# Patient Record
Sex: MALE | Race: WHITE | NOT HISPANIC OR LATINO | URBAN - METROPOLITAN AREA
[De-identification: names, ages, dates, MRNs, and addresses within clinical notes are randomized per-mention and may not be internally consistent; named-entity substitution may affect disease eponyms.]

---

## 2023-12-14 ENCOUNTER — TELEPHONE (OUTPATIENT)
Dept: PRIMARY CARE | Facility: CLINIC | Age: 63
End: 2023-12-14
Payer: COMMERCIAL

## 2023-12-14 DIAGNOSIS — Z00.00 WELLNESS EXAMINATION: ICD-10-CM

## 2023-12-14 NOTE — TELEPHONE ENCOUNTER
He's scheduled for a physical on 12-29-23 @ 9:00 but he would like to see you some time next week.  He's leaving for [place] on the 29th.  Could you please sign pended lab orders?  Can I add him on some time next week?

## 2023-12-14 NOTE — TELEPHONE ENCOUNTER
Labs ordered.  It looks like we could put him on on Friday 12/22.  Thank you and let me know if there is any issues.    Chidi Dupree MD

## 2023-12-15 ASSESSMENT — PROMIS GLOBAL HEALTH SCALE
RATE_QUALITY_OF_LIFE: VERY GOOD
EMOTIONAL_PROBLEMS: RARELY
CARRYOUT_SOCIAL_ACTIVITIES: EXCELLENT
CARRYOUT_PHYSICAL_ACTIVITIES: COMPLETELY
RATE_PHYSICAL_HEALTH: GOOD
RATE_SOCIAL_SATISFACTION: VERY GOOD
RATE_MENTAL_HEALTH: VERY GOOD
RATE_AVERAGE_FATIGUE: MILD
RATE_AVERAGE_PAIN: 5
RATE_GENERAL_HEALTH: GOOD

## 2023-12-18 ENCOUNTER — LAB (OUTPATIENT)
Dept: LAB | Facility: LAB | Age: 63
End: 2023-12-18
Payer: COMMERCIAL

## 2023-12-18 DIAGNOSIS — Z00.00 WELLNESS EXAMINATION: ICD-10-CM

## 2023-12-18 LAB
25(OH)D3 SERPL-MCNC: 41 NG/ML (ref 31–100)
ALBUMIN SERPL-MCNC: 4.4 G/DL (ref 3.5–5)
ALP BLD-CCNC: 68 U/L (ref 35–125)
ALT SERPL-CCNC: 18 U/L (ref 5–40)
ANION GAP SERPL CALC-SCNC: 11 MMOL/L
APPEARANCE UR: CLEAR
AST SERPL-CCNC: 23 U/L (ref 5–40)
BASOPHILS # BLD AUTO: 0.03 X10*3/UL (ref 0–0.1)
BASOPHILS NFR BLD AUTO: 0.6 %
BILIRUB SERPL-MCNC: 0.7 MG/DL (ref 0.1–1.2)
BILIRUB UR STRIP.AUTO-MCNC: NEGATIVE MG/DL
BUN SERPL-MCNC: 22 MG/DL (ref 8–25)
CALCIUM SERPL-MCNC: 9.3 MG/DL (ref 8.5–10.4)
CHLORIDE SERPL-SCNC: 104 MMOL/L (ref 97–107)
CHOLEST SERPL-MCNC: 225 MG/DL (ref 133–200)
CHOLEST/HDLC SERPL: 3.6 {RATIO}
CO2 SERPL-SCNC: 25 MMOL/L (ref 24–31)
COLOR UR: NORMAL
CREAT SERPL-MCNC: 1.3 MG/DL (ref 0.4–1.6)
CRP SERPL HS-MCNC: 0.2 MG/L
EOSINOPHIL # BLD AUTO: 0.05 X10*3/UL (ref 0–0.7)
EOSINOPHIL NFR BLD AUTO: 1 %
ERYTHROCYTE [DISTWIDTH] IN BLOOD BY AUTOMATED COUNT: 11.6 % (ref 11.5–14.5)
EST. AVERAGE GLUCOSE BLD GHB EST-MCNC: 108 MG/DL
GFR SERPL CREATININE-BSD FRML MDRD: 62 ML/MIN/1.73M*2
GLUCOSE SERPL-MCNC: 101 MG/DL (ref 65–99)
GLUCOSE UR STRIP.AUTO-MCNC: NORMAL MG/DL
HBA1C MFR BLD: 5.4 %
HCT VFR BLD AUTO: 46.4 % (ref 41–52)
HDLC SERPL-MCNC: 63 MG/DL
HGB BLD-MCNC: 16 G/DL (ref 13.5–17.5)
IMM GRANULOCYTES # BLD AUTO: 0.01 X10*3/UL (ref 0–0.7)
IMM GRANULOCYTES NFR BLD AUTO: 0.2 % (ref 0–0.9)
KETONES UR STRIP.AUTO-MCNC: NEGATIVE MG/DL
LDLC SERPL CALC-MCNC: 135 MG/DL (ref 65–130)
LEUKOCYTE ESTERASE UR QL STRIP.AUTO: NEGATIVE
LYMPHOCYTES # BLD AUTO: 2.08 X10*3/UL (ref 1.2–4.8)
LYMPHOCYTES NFR BLD AUTO: 41 %
MCH RBC QN AUTO: 31.6 PG (ref 26–34)
MCHC RBC AUTO-ENTMCNC: 34.5 G/DL (ref 32–36)
MCV RBC AUTO: 92 FL (ref 80–100)
MONOCYTES # BLD AUTO: 0.49 X10*3/UL (ref 0.1–1)
MONOCYTES NFR BLD AUTO: 9.7 %
NEUTROPHILS # BLD AUTO: 2.41 X10*3/UL (ref 1.2–7.7)
NEUTROPHILS NFR BLD AUTO: 47.5 %
NITRITE UR QL STRIP.AUTO: NEGATIVE
NRBC BLD-RTO: 0 /100 WBCS (ref 0–0)
PH UR STRIP.AUTO: 5 [PH]
PLATELET # BLD AUTO: 273 X10*3/UL (ref 150–450)
POTASSIUM SERPL-SCNC: 4.3 MMOL/L (ref 3.4–5.1)
PROT SERPL-MCNC: 6.3 G/DL (ref 5.9–7.9)
PROT UR STRIP.AUTO-MCNC: NEGATIVE MG/DL
PSA SERPL-MCNC: 0.9 NG/ML
RBC # BLD AUTO: 5.06 X10*6/UL (ref 4.5–5.9)
RBC # UR STRIP.AUTO: NEGATIVE /UL
SODIUM SERPL-SCNC: 140 MMOL/L (ref 133–145)
SP GR UR STRIP.AUTO: 1.02
TRIGL SERPL-MCNC: 135 MG/DL (ref 40–150)
TSH SERPL DL<=0.05 MIU/L-ACNC: 2.46 MIU/L (ref 0.27–4.2)
UROBILINOGEN UR STRIP.AUTO-MCNC: NORMAL MG/DL
WBC # BLD AUTO: 5.1 X10*3/UL (ref 4.4–11.3)

## 2023-12-18 PROCEDURE — 85025 COMPLETE CBC W/AUTO DIFF WBC: CPT

## 2023-12-18 PROCEDURE — 83036 HEMOGLOBIN GLYCOSYLATED A1C: CPT

## 2023-12-18 PROCEDURE — 81003 URINALYSIS AUTO W/O SCOPE: CPT

## 2023-12-18 PROCEDURE — 82306 VITAMIN D 25 HYDROXY: CPT

## 2023-12-18 PROCEDURE — 80053 COMPREHEN METABOLIC PANEL: CPT

## 2023-12-18 PROCEDURE — 36415 COLL VENOUS BLD VENIPUNCTURE: CPT

## 2023-12-18 PROCEDURE — 84443 ASSAY THYROID STIM HORMONE: CPT

## 2023-12-18 PROCEDURE — 86141 C-REACTIVE PROTEIN HS: CPT

## 2023-12-18 PROCEDURE — 84153 ASSAY OF PSA TOTAL: CPT

## 2023-12-18 PROCEDURE — 80061 LIPID PANEL: CPT

## 2023-12-22 ENCOUNTER — OFFICE VISIT (OUTPATIENT)
Dept: PRIMARY CARE | Facility: CLINIC | Age: 63
End: 2023-12-22
Payer: COMMERCIAL

## 2023-12-22 VITALS
BODY MASS INDEX: 25.92 KG/M2 | SYSTOLIC BLOOD PRESSURE: 110 MMHG | HEIGHT: 69 IN | HEART RATE: 79 BPM | OXYGEN SATURATION: 98 % | DIASTOLIC BLOOD PRESSURE: 76 MMHG | WEIGHT: 175 LBS

## 2023-12-22 DIAGNOSIS — D12.6 SESSILE SERRATED POLYP OF COLON: ICD-10-CM

## 2023-12-22 DIAGNOSIS — Z00.00 WELLNESS EXAMINATION: Primary | ICD-10-CM

## 2023-12-22 DIAGNOSIS — L57.0 ACTINIC KERATOSES: ICD-10-CM

## 2023-12-22 DIAGNOSIS — N50.89 SCROTUM SWELLING: ICD-10-CM

## 2023-12-22 DIAGNOSIS — R00.2 PALPITATIONS: ICD-10-CM

## 2023-12-22 PROBLEM — E78.2 MIXED HYPERLIPIDEMIA: Status: ACTIVE | Noted: 2020-12-21

## 2023-12-22 PROBLEM — M51.379 DEGENERATION OF LUMBAR OR LUMBOSACRAL INTERVERTEBRAL DISC: Status: ACTIVE | Noted: 2021-05-26

## 2023-12-22 PROBLEM — Z96.641 HISTORY OF TOTAL HIP REPLACEMENT, RIGHT: Status: ACTIVE | Noted: 2019-10-30

## 2023-12-22 PROBLEM — M16.11 PRIMARY OSTEOARTHRITIS OF RIGHT HIP: Status: ACTIVE | Noted: 2023-12-22

## 2023-12-22 PROBLEM — R93.1 AGATSTON CORONARY ARTERY CALCIUM SCORE LESS THAN 100: Status: ACTIVE | Noted: 2021-05-24

## 2023-12-22 PROBLEM — M16.12 PRIMARY OSTEOARTHRITIS OF LEFT HIP: Status: ACTIVE | Noted: 2021-05-24

## 2023-12-22 PROBLEM — R79.89 ELEVATED TESTOSTERONE LEVEL: Status: ACTIVE | Noted: 2021-03-01

## 2023-12-22 PROBLEM — M51.37 DEGENERATION OF LUMBAR OR LUMBOSACRAL INTERVERTEBRAL DISC: Status: ACTIVE | Noted: 2021-05-26

## 2023-12-22 PROBLEM — N18.2 CKD (CHRONIC KIDNEY DISEASE), STAGE II: Status: RESOLVED | Noted: 2023-12-22 | Resolved: 2023-12-22

## 2023-12-22 PROBLEM — M16.11 PRIMARY OSTEOARTHRITIS OF RIGHT HIP: Status: RESOLVED | Noted: 2023-12-22 | Resolved: 2023-12-22

## 2023-12-22 PROBLEM — N18.2 CKD (CHRONIC KIDNEY DISEASE), STAGE II: Status: ACTIVE | Noted: 2023-12-22

## 2023-12-22 PROBLEM — Z98.890 HX OF LUMBOSACRAL SPINE SURGERY: Status: ACTIVE | Noted: 2021-05-26

## 2023-12-22 PROCEDURE — 93000 ELECTROCARDIOGRAM COMPLETE: CPT | Performed by: INTERNAL MEDICINE

## 2023-12-22 PROCEDURE — UHSPHYS PR UH SELECT PHYSICAL: Performed by: INTERNAL MEDICINE

## 2023-12-22 PROCEDURE — 1036F TOBACCO NON-USER: CPT | Performed by: INTERNAL MEDICINE

## 2023-12-22 RX ORDER — ASCORBIC ACID 125 MG
TABLET,CHEWABLE ORAL
COMMUNITY

## 2023-12-22 RX ORDER — AMOXICILLIN 500 MG/1
CAPSULE ORAL
COMMUNITY
Start: 2023-11-29

## 2023-12-22 ASSESSMENT — ENCOUNTER SYMPTOMS
WOUND: 0
DIARRHEA: 0
HEADACHES: 0
SHORTNESS OF BREATH: 0
BACK PAIN: 0
CONSTIPATION: 0
COUGH: 0
DYSPHORIC MOOD: 0
DYSURIA: 0
SORE THROAT: 0
ARTHRALGIAS: 0
WEAKNESS: 0
FATIGUE: 0
CHEST TIGHTNESS: 0
PALPITATIONS: 1
DIZZINESS: 0

## 2023-12-22 NOTE — PATIENT INSTRUCTIONS
Wellness exam/physical  Continued healthy diet rich in fruits, vegetables, fiber, lean protein recommended  Continued regular exercise routine recommended  Vaccines discussed/recommended include:  RSV and COVID-19  Shingrix    Intermittent palpitations (recorded heart rate of 200 bpm lasting several minutes on 2 occasions)  Evaluate for cardiac arrhythmia/cardiomyopathy  Cardiac event monitor ordered  Echocardiogram ordered  Referral to electrophysiology for consultation  Consider purchasing [employer]    Right scrotal swelling, suspected hydrocele  Ultrasound of scrotum  Referral to urology for consultation    History of colon polyp (sessile serrated polyp)  Screening colonoscopy in 12/2025    Actinic keratoses  Continue routine follow-up with dermatology for full body skin exams  Limit sun exposure as able    Follow-up  Wellness exam/physical in 1 year, December 2024

## 2023-12-22 NOTE — PROGRESS NOTES
Subjective   Patient ID: Chidi Fowler is a 63 y.o. male who presents for Annual Exam.    Wellness exam/physical    Overall, patient is feeling well, exercising regularly, maintaining healthy diet.    He notes concern regarding cardiovascular health.  Previous CT calcium score was 0 in 2019.  He exercises to a high level without chest pain, dizziness, unanticipated dyspnea, or palpitations.  He notes concern regarding peak heart rate in the 160 range, stating it seems higher than it should be and does not improve despite his continued exercise.  Interestingly, he shares 2 episodes of documented rapid heart rate over the last 1 year.  The most recent episode occurred on 6/28/2023 (with review of his wearable device today).  Each episode has occurred when he was settling down to go to sleep.  He measured with [employer] chest monitor that recorded heart rate at 225 bpm and lasting aprox 4 minutes.  During episode, patient states that he was having racing thoughts regarding current events/dizziness/etc.  There was no lightheadedness, chest pain, shortness of breath, syncope.  He has not had these episodes at any other time or with exertion.  He denies any history of arrhythmias.  ECG today notable for sinus bradycardia without ischemic changes or QT prolongation.    Right scrotal swelling  Onset 1 month ago.  Patient denies any trauma.  No pain.  Mild irritation due to the size of the scrotum.  No dysuria, hematuria.  No history of similar problems.    History of colon polyp (sessile serrated polyp)  Previous colonoscopy on 12/4/2020 at which time single sessile serrated polyp removed.  Colonoscopy report suggest 7-year follow-up, the patient I discussed pursuing 5-year follow-up.    Actinic keratoses  Patient sees dermatology regularly    Health maintenance  Exercise: High-level exercise including weights/core exercises/stretching/and 20 minutes of aerobic activity such as cycling, 3 times per week  Ophthalmology:  "Followed by Dr. Macedo and Dr. Harrison (for retinal \"freckle\").  Dentistry: Up-to-date    Social  Born in [place],  with children  Occupation: [employer] company, co-owner with 2 other partners             Review of Systems   Constitutional:  Negative for fatigue.   HENT:  Negative for sore throat.    Respiratory:  Negative for cough, chest tightness and shortness of breath.    Cardiovascular:  Positive for palpitations. Negative for chest pain and leg swelling.   Gastrointestinal:  Negative for constipation and diarrhea.   Genitourinary:  Positive for scrotal swelling. Negative for dysuria and urgency.   Musculoskeletal:  Negative for arthralgias, back pain and gait problem.   Skin:  Negative for wound.   Neurological:  Negative for dizziness, syncope, weakness and headaches.   Psychiatric/Behavioral:  Negative for dysphoric mood.        Objective   /76 (BP Location: Left arm, Patient Position: Sitting, BP Cuff Size: Adult)   Pulse 79   Ht 1.753 m (5' 9\")   Wt 79.4 kg (175 lb)   SpO2 98%   BMI 25.84 kg/m²     Physical Exam  Vitals reviewed.   Constitutional:       Appearance: Normal appearance.   HENT:      Head: Normocephalic.      Right Ear: Tympanic membrane normal.      Left Ear: Tympanic membrane normal.      Mouth/Throat:      Mouth: Mucous membranes are moist.   Eyes:      Conjunctiva/sclera: Conjunctivae normal.      Pupils: Pupils are equal, round, and reactive to light.   Cardiovascular:      Rate and Rhythm: Normal rate and regular rhythm.   Pulmonary:      Effort: Pulmonary effort is normal.      Breath sounds: Normal breath sounds. No wheezing.   Genitourinary:     Testes: Normal.      Prostate: Normal.      Comments: Scrotum with uniformly soft, nontender swelling surrounding right testicle  Musculoskeletal:      Right lower leg: No edema.      Left lower leg: No edema.      Comments: Bilateral hips and knees with full range of motion   Lymphadenopathy:      Cervical: No " cervical adenopathy.   Neurological:      General: No focal deficit present.      Mental Status: He is alert and oriented to person, place, and time.         Assessment/Plan     Wellness exam/physical  Continued healthy diet rich in fruits, vegetables, fiber, lean protein recommended  Continued regular exercise routine recommended  Vaccines discussed/recommended include:  RSV and COVID-19 and Shingrix    Intermittent palpitations (recorded heart rate of 200 bpm lasting several minutes on 2 occasions)  Evaluate for cardiac arrhythmia/cardiomyopathy  Cardiac event monitor ordered  Echocardiogram ordered  Referral to electrophysiology for consultation  Consider purchasing [employer]    Right scrotal swelling, suspected hydrocele  Ultrasound of scrotum  Referral to urology for consultation    History of colon polyp (sessile serrated polyp)  Screening colonoscopy in 12/2025    Actinic keratoses  Continue routine follow-up with dermatology for full body skin exams  Limit sun exposure as able    Follow-up  Wellness exam/physical in 1 year, December 2024    Chidi Dupree MD

## 2023-12-26 ENCOUNTER — ANCILLARY PROCEDURE (OUTPATIENT)
Dept: RADIOLOGY | Facility: CLINIC | Age: 63
End: 2023-12-26
Payer: COMMERCIAL

## 2023-12-26 DIAGNOSIS — N50.89 SCROTUM SWELLING: ICD-10-CM

## 2023-12-26 PROCEDURE — 76870 US EXAM SCROTUM: CPT

## 2023-12-26 PROCEDURE — 93976 VASCULAR STUDY: CPT | Performed by: RADIOLOGY

## 2023-12-26 PROCEDURE — 76870 US EXAM SCROTUM: CPT | Performed by: RADIOLOGY

## 2023-12-29 ENCOUNTER — APPOINTMENT (OUTPATIENT)
Dept: PRIMARY CARE | Facility: CLINIC | Age: 63
End: 2023-12-29
Payer: COMMERCIAL

## 2023-12-29 PROBLEM — N44.2 CYST OF TESTIS: Status: ACTIVE | Noted: 2023-12-29

## 2023-12-29 PROBLEM — N43.3 HYDROCELE, RIGHT: Status: ACTIVE | Noted: 2023-12-29

## 2024-01-10 ENCOUNTER — HOSPITAL ENCOUNTER (OUTPATIENT)
Dept: CARDIOLOGY | Facility: HOSPITAL | Age: 64
Discharge: HOME | End: 2024-01-10
Payer: COMMERCIAL

## 2024-01-10 DIAGNOSIS — R00.2 PALPITATIONS: ICD-10-CM

## 2024-01-10 LAB
AORTIC VALVE PEAK VELOCITY: 1.25
AV PEAK GRADIENT: 6.3
AVA (PEAK VEL): 2.37
EJECTION FRACTION APICAL 4 CHAMBER: 57
EJECTION FRACTION: 59
LEFT ATRIUM VOLUME AREA LENGTH INDEX BSA: 30.7
LEFT VENTRICLE INTERNAL DIMENSION DIASTOLE: 4 (ref 3.5–6)
LEFT VENTRICULAR OUTFLOW TRACT DIAMETER: 2
MITRAL VALVE E/A RATIO: 1.23
MITRAL VALVE E/E' RATIO: 6.2
RIGHT VENTRICLE FREE WALL PEAK S': 17.9
RIGHT VENTRICLE PEAK SYSTOLIC PRESSURE: 7
TRICUSPID ANNULAR PLANE SYSTOLIC EXCURSION: 2.6

## 2024-01-10 PROCEDURE — 93246 EXT ECG>7D<15D RECORDING: CPT

## 2024-01-10 PROCEDURE — 93306 TTE W/DOPPLER COMPLETE: CPT | Performed by: INTERNAL MEDICINE

## 2024-01-10 PROCEDURE — 93306 TTE W/DOPPLER COMPLETE: CPT

## 2024-01-29 PROCEDURE — 93248 EXT ECG>7D<15D REV&INTERPJ: CPT | Performed by: INTERNAL MEDICINE

## 2024-02-02 ENCOUNTER — TELEPHONE (OUTPATIENT)
Dept: PRIMARY CARE | Facility: CLINIC | Age: 64
End: 2024-02-02
Payer: COMMERCIAL

## 2024-02-02 NOTE — TELEPHONE ENCOUNTER
I called patient and we reviewed recent event monitor.  Monitor notable for single 3 beat run of VT (occurred at 3 in the morning, patient asleep at the time).  Patient has not been having any symptoms.  Recent echocardiogram with normal ejection fraction.    Patient has EP consult scheduled later this month.  He has been advised to contact office if any problems prior.    Chidi Dupree MD

## 2024-02-12 ENCOUNTER — APPOINTMENT (OUTPATIENT)
Dept: UROLOGY | Facility: CLINIC | Age: 64
End: 2024-02-12
Payer: COMMERCIAL

## 2024-02-13 ENCOUNTER — APPOINTMENT (OUTPATIENT)
Dept: CARDIOLOGY | Facility: CLINIC | Age: 64
End: 2024-02-13
Payer: COMMERCIAL

## 2024-02-20 ENCOUNTER — OFFICE VISIT (OUTPATIENT)
Dept: CARDIOLOGY | Facility: HOSPITAL | Age: 64
End: 2024-02-20
Payer: COMMERCIAL

## 2024-02-20 VITALS
DIASTOLIC BLOOD PRESSURE: 81 MMHG | HEIGHT: 69 IN | BODY MASS INDEX: 26.66 KG/M2 | OXYGEN SATURATION: 98 % | SYSTOLIC BLOOD PRESSURE: 124 MMHG | HEART RATE: 75 BPM | WEIGHT: 180 LBS

## 2024-02-20 DIAGNOSIS — R00.2 PALPITATIONS: ICD-10-CM

## 2024-02-20 DIAGNOSIS — I49.3 PVC (PREMATURE VENTRICULAR CONTRACTION): ICD-10-CM

## 2024-02-20 PROBLEM — I47.20 VT (VENTRICULAR TACHYCARDIA) (MULTI): Status: ACTIVE | Noted: 2024-02-20

## 2024-02-20 PROCEDURE — 99204 OFFICE O/P NEW MOD 45 MIN: CPT | Performed by: INTERNAL MEDICINE

## 2024-02-20 PROCEDURE — 93010 ELECTROCARDIOGRAM REPORT: CPT | Performed by: INTERNAL MEDICINE

## 2024-02-20 PROCEDURE — 99214 OFFICE O/P EST MOD 30 MIN: CPT | Performed by: INTERNAL MEDICINE

## 2024-02-20 PROCEDURE — 93005 ELECTROCARDIOGRAM TRACING: CPT | Performed by: INTERNAL MEDICINE

## 2024-02-20 PROCEDURE — 1036F TOBACCO NON-USER: CPT | Performed by: INTERNAL MEDICINE

## 2024-02-20 ASSESSMENT — PAIN SCALES - GENERAL: PAINLEVEL: 0-NO PAIN

## 2024-02-20 ASSESSMENT — ENCOUNTER SYMPTOMS
LOSS OF SENSATION IN FEET: 0
DEPRESSION: 0
OCCASIONAL FEELINGS OF UNSTEADINESS: 0

## 2024-02-20 NOTE — PROGRESS NOTES
"I had the pleasure seeing Chidi Fowler     Chief Complaint   Patient presents with    Palpitations           PVC (Premature Ventricular Contractions)      OUTPATIENT CONSULTATION: Cardiac Electrophysiology  DOS: February 20, 2024  REASON: Palpitations and PVCs  REFERRING PHYSICIAN: Chidi Dupree        Current Outpatient Medications   Medication Instructions    amoxicillin (Amoxil) 500 mg capsule TAKE 4 CAPS 1 HOUR BEFORE APPOINTMENT AND TAKE 2 CAPS 6 HOURS AFTER APPOINTMENT    melatonin 5 mg tablet,chewable oral, Take 2 tablets by mouth at bedtime    protein supplement (PROTEIN ORAL) oral, Shake or bars daily as needed    psyllium seed (PSYLLIUM ORAL) Take 2 teaspoon in 8 ounces daily        Chidi Fowler is a 63 y.o. with:     Hyperlipidemia (CACS   Lumbar disc disease s/p LS spine surgery  Abnormal Holter /Non Sustained VT - he had two episodes in the past when his heart rate was elevated for several minutes. Both times that happened in the early morning hours when he was going on a trip and it is quite possible that he was anxious at that time. The wearable monitor showed 225 bpm and it gradually went down to normal. He is otherwise very active. No issues with exercise.       TESTING    -ECHO/ TTE:  (1/10/24) LVEF 55-60%.  Normal structure and function    -MONITOR:   [employer] 14  days (Jan 2024) showed predominant NSR, bradycardia ([place] 12%)  and tachycardia ([place] 5%).  Min HR 44 bpm, Max  bpm, Avg HR 72 bpm.  Rare SVEs and VEs ([place]rence <1%) including 1 V-triplet        Objective   Physical Exam  /81 (BP Location: Left arm, Patient Position: Sitting, BP Cuff Size: Adult)   Pulse 75   Ht 1.753 m (5' 9\")   Wt 81.6 kg (180 lb)   SpO2 98%   BMI 26.58 kg/m²     General Appearance:  Alert, cooperative, no distress, appears stated age   Head:  Normocephalic, without obvious abnormality, atraumatic   Eyes:  PERRL, conjunctiva/corneas clear, EOM's intact, fundi benign, both eyes   Ears:  Normal " TM's and external ear canals, both ears   Nose: Nares normal, septum midline, mucosa normal, no drainage or sinus tenderness   Throat: Lips, mucosa, and tongue normal; teeth and gums normal   Neck: Supple, symmetrical, trachea midline, no adenopathy, thyroid: not enlarged, symmetric, no tenderness/mass/nodules, no carotid bruit or JVD   Back:   Symmetric, no curvature, ROM normal, no CVA tenderness   Lungs:   Clear to auscultation bilaterally, respirations unlabored   Chest Wall:  No tenderness or deformity   Heart:  Regular rate and rhythm, S1, S2 normal, no murmur, rub or gallop   Abdomen:   Soft, non-tender, bowel sounds active all four quadrants,  no masses, no organomegaly   Genitalia:  Normal male   Rectal:  Normal tone, normal prostate, no masses or tenderness;  guaiac negative stool   Extremities: Extremities normal, atraumatic, no cyanosis or edema   Pulses: 2+ and symmetric   Skin: Skin color, texture, turgor normal, no rashes or lesions   Lymph nodes: Cervical, supraclavicular, and axillary nodes normal   Neurologic: Normal           Assessment/Plan   VT (ventricular tachycardia) (CMS/HCC)  He had a monitor in January of 2024 that showed 3 beats run of VT. The CL is irregular which is suggestive of triggered (rather than reentry mechanism) and is probably a consequence of elevated sympathetic tone at that time (Sleep apnea? ) . He had an ECHO that was completely normal so I did explain to him the prognosis and the importance of the finding. For now no additional testing is needed or therapy.     He had two episodes in the past associated with faster heart rates. He did buy a [employer] and should this happens again he will email us the ECG strips.

## 2024-02-20 NOTE — ASSESSMENT & PLAN NOTE
He had a monitor in January of 2024 that showed 3 beats run of VT. The CL is irregular which is suggestive of triggered (rather than reentry mechanism) and is probably a consequence of elevated sympathetic tone at that time (Sleep apnea? ) . He had an ECHO that was completely normal so I did explain to him the prognosis and the importance of the finding. For now no additional testing is needed or therapy.     He had two episodes in the past associated with faster heart rates. He did buy a [employer] and should this happens again he will email us the ECG strips.

## 2024-02-21 ENCOUNTER — OFFICE VISIT (OUTPATIENT)
Dept: UROLOGY | Facility: CLINIC | Age: 64
End: 2024-02-21
Payer: COMMERCIAL

## 2024-02-21 VITALS
HEART RATE: 75 BPM | BODY MASS INDEX: 26.43 KG/M2 | SYSTOLIC BLOOD PRESSURE: 143 MMHG | WEIGHT: 179 LBS | TEMPERATURE: 97.7 F | DIASTOLIC BLOOD PRESSURE: 82 MMHG

## 2024-02-21 DIAGNOSIS — N50.89 SCROTUM SWELLING: Primary | ICD-10-CM

## 2024-02-21 LAB
POC APPEARANCE, URINE: CLEAR
POC BILIRUBIN, URINE: NEGATIVE
POC BLOOD, URINE: NEGATIVE
POC COLOR, URINE: YELLOW
POC GLUCOSE, URINE: NEGATIVE MG/DL
POC KETONES, URINE: NEGATIVE MG/DL
POC LEUKOCYTES, URINE: NEGATIVE
POC NITRITE,URINE: NEGATIVE
POC PH, URINE: 6 PH
POC PROTEIN, URINE: NEGATIVE MG/DL
POC SPECIFIC GRAVITY, URINE: 1.01
POC UROBILINOGEN, URINE: 0.2 EU/DL

## 2024-02-21 PROCEDURE — 99203 OFFICE O/P NEW LOW 30 MIN: CPT | Performed by: STUDENT IN AN ORGANIZED HEALTH CARE EDUCATION/TRAINING PROGRAM

## 2024-02-21 PROCEDURE — 81003 URINALYSIS AUTO W/O SCOPE: CPT | Performed by: STUDENT IN AN ORGANIZED HEALTH CARE EDUCATION/TRAINING PROGRAM

## 2024-02-21 PROCEDURE — 1036F TOBACCO NON-USER: CPT | Performed by: STUDENT IN AN ORGANIZED HEALTH CARE EDUCATION/TRAINING PROGRAM

## 2024-02-21 NOTE — PROGRESS NOTES
Scribed for Dr. George James by Richard Scanlon. I, Dr. George James have personally reviewed and agreed with the information entered by the Virtual Scribe. 02/21/24.    ASSESSMENT:  Problem List Items Addressed This Visit       Scrotum swelling - Primary    Relevant Orders    POCT UA Automated manually resulted (Completed)      Epididymal cysts  Spermatoceles  Hydrocele, right  BPH with weak stream    PLAN:  Reviewed scrotal US results.   Reassured of benign findings.   No suspicious findings on PE today.   Suspect his mild R hemiscrotal swelling is 2/2 his hydrocele.   Epididymal cysts appear benign, advised to call if he appreciates acute nodularities or changes.     For his R hydrocele.   Discussed non-surgical vs surgical options.   He elects to proceed with surveillance only.     All questions were answered to the patient’s satisfaction.  Patient agrees with the plan and wishes to proceed.  Follow-up will be scheduled appropriately.     SUBJECTIVE (HPI):  Chidi presents as a new patient for an evaluation.  The patient’s EMR has been reviewed.  Lives in [place], [place].   Has a 2nd home in [place].   Occupation: [employer].  Children: 2x children (boys).  Referred by his PCP for abnormal scrotal US findings.     TODAY (02/21/24)  In November 2023, he began to notice a scrotal mass.   Primarily located on the R side (testes).   Has not significant changed since then.   Denies any pain/discomfort or inguinal swelling.  Proceeded to follow up with his PCP in Dec 2023.   Since undergone a scrotal US and was referred to me.   Denies any prior  surgeries.   Scrotal ultrasound results reviewed with patient.     From a urinary standpoint.   Reports mild straining, and weak stream.   However, not very bothersome.   Nocturia x1. IPSS -12.     Scrotal US (12/26/23) personally reviewed.  - Showed multiple cysts within the testes bilaterally, measuring up to 8 mm on the right. Cystic dilatation of the rete  testes. Bilateral epididymal cysts/spermatoceles which measure up to 6 mm on the left, some of which are complex. Large complex right-sided hydrocele.    PSH: multiple joint and spine surgeries.  Denies FH of  malignancy.   SH: non-smoker.     Past medical, surgical, family and social history in the chart was reviewed and is accurate including any additions to what is in this HPI.    Review of Systems   Constitutional: denies any unintentional weight loss or change in strength.  Integumentary: denies any rashes or pruritus.  Eyes: denies any double vision or eye pain.  Ear/Nose/Mouth/Throat: denies any nosebleeds or gum bleeds.  Cardiovascular: denies any chest pain or syncope.  Respiratory: denies hemoptysis.  Gastrointestinal: denies nausea or vomiting.  Musculoskeletal: denies muscle cramping or weakness.  Neurologic: denies convulsions or seizures.  Hematologic/Lymphatic: denies bleeding tendencies.  Endocrine: denies heat/cold intolerance.  All other systems have been reviewed and are negative unless otherwise noted in the HPI.    OBJECTIVE:  Visit Vitals  /82   Pulse 75   Temp 36.5 °C (97.7 °F)     Physical Exam   Constitutional: No obvious distress.  Eyes: Non-injected conjunctiva, sclera clear, EOMI.  Ears/Nose/Mouth/Throat: No obvious drainage per ears or nose.  Cardiovascular: Extremities are warm and well perfused. No edema, cyanosis or pallor.  Respiratory: No audible wheezing/stridor; respirations do not appear labored.  Gastrointestinal: Abdomen soft, not distended.  Musculoskeletal: Normal ROM of extremities.  Skin: No obvious rashes or open sores.  Neurologic: Alert and oriented, CN 2-12 grossly intact.  Psychiatric: Answers questions appropriately with normal affect.  Hematologic/Lymphatic/Immunologic: No obvious bruises or sites of spontaneous bleeding.  Genitourinary: No CVA tenderness, bladder not palpable.   Testicles symmetrically normal in size without mass, modest right hydrocele that  is soft    LABS and IMAGING:  Lab Results   Component Value Date    WBC 5.1 12/18/2023    HGB 16.0 12/18/2023    HCT 46.4 12/18/2023     12/18/2023    CHOL 225 (H) 12/18/2023    TRIG 135 12/18/2023    HDL 63.0 12/18/2023    ALT 18 12/18/2023    AST 23 12/18/2023     12/18/2023    K 4.3 12/18/2023     12/18/2023    CREATININE 1.30 12/18/2023    BUN 22 12/18/2023    CO2 25 12/18/2023    TSH 2.46 12/18/2023    PSA 0.7 03/07/2019    HGBA1C 5.4 12/18/2023     Scrotal US (12/26/23) reviewed.  - Multiple cysts within the testes bilaterally, measuring up to 8 mm on the right. Cystic dilatation of the rete testes. Bilateral epididymal cysts/spermatoceles which measure up to 6 mm on the left, some of which are complex. Large complex right-sided hydrocele.    Scribed for Dr. George James by Richard Scanlon.  I, Dr. George James have personally reviewed and agreed with the information entered by the Virtual Scribe. 02/21/24.

## 2024-02-22 LAB
ATRIAL RATE: 64 BPM
P AXIS: 77 DEGREES
P OFFSET: 208 MS
P ONSET: 155 MS
PR INTERVAL: 146 MS
Q ONSET: 228 MS
QRS COUNT: 10 BEATS
QRS DURATION: 100 MS
QT INTERVAL: 392 MS
QTC CALCULATION(BAZETT): 404 MS
QTC FREDERICIA: 400 MS
R AXIS: 90 DEGREES
T AXIS: 70 DEGREES
T OFFSET: 424 MS
VENTRICULAR RATE: 64 BPM

## 2024-08-21 ENCOUNTER — PATIENT MESSAGE (OUTPATIENT)
Dept: PRIMARY CARE | Facility: CLINIC | Age: 64
End: 2024-08-21
Payer: COMMERCIAL

## 2024-08-21 DIAGNOSIS — Z00.00 WELLNESS EXAMINATION: Primary | ICD-10-CM

## 2024-08-28 ENCOUNTER — APPOINTMENT (OUTPATIENT)
Dept: UROLOGY | Facility: CLINIC | Age: 64
End: 2024-08-28
Payer: COMMERCIAL

## 2024-08-28 VITALS
HEART RATE: 60 BPM | DIASTOLIC BLOOD PRESSURE: 80 MMHG | HEIGHT: 69 IN | WEIGHT: 179 LBS | SYSTOLIC BLOOD PRESSURE: 131 MMHG | BODY MASS INDEX: 26.51 KG/M2 | TEMPERATURE: 96.6 F

## 2024-08-28 DIAGNOSIS — N50.819 PAIN IN TESTICLE, UNSPECIFIED LATERALITY: ICD-10-CM

## 2024-08-28 DIAGNOSIS — N50.89 SCROTUM SWELLING: Primary | ICD-10-CM

## 2024-08-28 DIAGNOSIS — N43.0 ENCYSTED HYDROCELE: ICD-10-CM

## 2024-08-28 LAB
POC APPEARANCE, URINE: CLEAR
POC BILIRUBIN, URINE: NEGATIVE
POC BLOOD, URINE: NEGATIVE
POC COLOR, URINE: YELLOW
POC GLUCOSE, URINE: NEGATIVE MG/DL
POC KETONES, URINE: NEGATIVE MG/DL
POC LEUKOCYTES, URINE: NEGATIVE
POC NITRITE,URINE: NEGATIVE
POC PH, URINE: 7 PH
POC PROTEIN, URINE: NEGATIVE MG/DL
POC SPECIFIC GRAVITY, URINE: 1.01
POC UROBILINOGEN, URINE: 0.2 EU/DL

## 2024-08-28 PROCEDURE — 99214 OFFICE O/P EST MOD 30 MIN: CPT | Performed by: STUDENT IN AN ORGANIZED HEALTH CARE EDUCATION/TRAINING PROGRAM

## 2024-08-28 PROCEDURE — 3008F BODY MASS INDEX DOCD: CPT | Performed by: STUDENT IN AN ORGANIZED HEALTH CARE EDUCATION/TRAINING PROGRAM

## 2024-08-28 PROCEDURE — 81003 URINALYSIS AUTO W/O SCOPE: CPT | Performed by: STUDENT IN AN ORGANIZED HEALTH CARE EDUCATION/TRAINING PROGRAM

## 2024-08-28 NOTE — PROGRESS NOTES
Scribed for Dr. George James by Richard Scanlon. I, Dr. George James have personally reviewed and agreed with the information entered by the Virtual Scribe. 08/28/24.    ASSESSMENT:  Problem List Items Addressed This Visit       Scrotum swelling - Primary    Relevant Orders    US scrotum w doppler     Other Visit Diagnoses       Pain in testicle, unspecified laterality        Relevant Orders    POCT UA Automated manually resulted (Completed)    US scrotum w doppler    Encysted hydrocele        Relevant Orders    US scrotum w doppler          Epididymal cysts  Spermatoceles  Hydrocele, right  BPH with weak stream    PLAN:  #Hydrocele, right  Reviewed scrotal US results.   Reassured of benign findings.   No concerning findings on exam.   Suspect his mild R hemiscrotal swelling is 2/2 his hydrocele.  Albeit, c/o persistent R scrotal discomfort.     Discussed non-surgical vs surgical options including hydrocelectomy.  Risks, benefits, and complications discussed.   Elects to repeat a scrotal US and follow up in 2 weeks to review results.     #Epididymal cysts  Epididymal cysts are considered benign;  Provided reassurance;   Advised to call if he appreciates acute nodularities or changes.     #PSA screening  Last PSA ~ 0.90 (Dec 2023)  Repeat ordered, not yet performed.   FH of prostate cancer (Uncle)  Advised to continue annual PSA screening.     All questions were answered to the patient’s satisfaction.  Patient agrees with the plan and wishes to proceed.  Continue follow-up for ongoing care of his chronic medical conditions.       History of Present Illness (HPI):  Chidi presents for a follow up visit.  The patient’s EMR has been reviewed.  Lives in [place], [place].   Has a 2nd home in [place].   Occupation: [employer].  Children: 2x children (boys).    Hx of epididymal cysts, and R hydrocele.   Initially referred by his PCP for abnormal scrotal US.    TODAY: (08/28/24)  Presents c/o persistent R scrotal  discomfort.   Described as mild, but waxes and wanes in severity.   At times, has disrupted his ability to sleep.   Improves with taking Aleve PRN.   Worse with activity or exercise.   Denies any recent infections, gross hematuria, fevers, or chills.   IO urinalysis negative for blood or infection.     TO REVIEW: Initial visit (02/21/24)  In November 2023, he began to notice a scrotal mass.   Primarily located on the R side (testes).   Has not significant changed since then.   Denies any pain/discomfort or inguinal swelling.  Proceeded to follow up with his PCP in Dec 2023.   Since undergone a scrotal US and was referred to me.   Denies any prior  surgeries.   Scrotal ultrasound results reviewed with patient.      From a urinary standpoint.   Reports mild straining, and weak stream.   However, not very bothersome.   Nocturia x1. IPSS -12.      Scrotal US (12/26/23) personally reviewed.  Bilateral epididymal cysts/spermatoceles;  measure up to 6 mm on the left, some of which are complex.   Large complex right-sided hydrocele.  Cystic dilatation of the rete testes.     PSH: multiple joint and spine surgeries.  FH: Prostate cancer (Uncle)  SH: non-smoker.      No past medical history on file.  Past Surgical History:   Procedure Laterality Date    CT HEAD ANGIO W AND WO IV CONTRAST  01/18/2017    CT HEAD ANGIO W AND WO IV CONTRAST [facility]    KNEE ARTHROSCOPY W/ DEBRIDEMENT Left 1983    Knee Arthroscopy (Therapeutic), ACL present, NOT repaired    LUMBAR DISC SURGERY  2005    L4-5 discectomy (in 2005)    MR NECK ANGIO WO IV CONTRAST  12/07/2016    MR NECK ANGIO WO IV CONTRAST [facility]    SHOULDER Right 1988    AC dislocation with repair    TOTAL HIP ARTHROPLASTY Right 2019    Dr. Huddleston     Family History   Problem Relation Name Age of Onset    Arthritis Mother Shaniqua Fowler     Hypertension Mother Shaniqua Fowler     Vision loss Mother Shaniqua Fowler     Hearing loss Father Gaurang Fowler      Hernia Father Gaurang Fowler     Stroke Father Gaurang Fowler 65        First at 65 and second at 65 and  2022    Hypertension Sister Melinda     Hyperlipidemia Sister Melinda     Multiple sclerosis Sister Ayaan     Arthritis Brother Gabo Fowler         SALVADOR b/l    Cancer Mother's Brother Vamshi Lockhart         Brain    Prostate cancer Father's Brother Ubaldo     Heart disease Maternal Grandmother Priscila Fowler          with heart attack    Pneumonia Maternal Grandfather           with illness    Dementia Paternal Grandmother           at 88     Social History     Tobacco Use   Smoking Status Never   Smokeless Tobacco Never     Current Outpatient Medications   Medication Sig Dispense Refill    melatonin 5 mg tablet,chewable Chew. Take 2 tablets by mouth at bedtime      protein supplement (PROTEIN ORAL) Take by mouth. Shake or bars daily as needed      psyllium seed (PSYLLIUM ORAL) Take 2 teaspoon in 8 ounces daily      amoxicillin (Amoxil) 500 mg capsule TAKE 4 CAPS 1 HOUR BEFORE APPOINTMENT AND TAKE 2 CAPS 6 HOURS AFTER APPOINTMENT       No current facility-administered medications for this visit.     No Known Allergies  Past medical, surgical, family and social history in the chart was reviewed and is accurate including any additions to what is in this HPI.    REVIEW OF SYSTEMS (ROS):   Constitutional: denies any unintentional weight loss or change in strength.  Integumentary: denies any rashes or pruritus.  Eyes: denies any double vision or eye pain.  Ear/Nose/Mouth/Throat: denies any nosebleeds or gum bleeds.  Cardiovascular: denies any chest pain or syncope.  Respiratory: denies hemoptysis.  Gastrointestinal: denies nausea or vomiting.  Musculoskeletal: denies muscle cramping or weakness.  Neurologic: denies convulsions or seizures.  Hematologic/Lymphatic: denies bleeding tendencies.  Endocrine: denies heat/cold intolerance.  All other systems have been reviewed and are negative unless otherwise noted  in the HPI.     OBJECTIVE:  Visit Vitals  /80 (BP Location: Right arm, Patient Position: Sitting, BP Cuff Size: Adult)   Pulse 60   Temp 35.9 °C (96.6 °F) (Temporal)     PHYSICAL EXAM:  Constitutional: No obvious distress.  Eyes: Non-injected conjunctiva, sclera clear, EOMI.  Ears/Nose/Mouth/Throat: No obvious drainage per ears or nose.  Cardiovascular: Extremities are warm and well perfused. No edema, cyanosis or pallor.  Respiratory: No audible wheezing/stridor; respirations do not appear labored.  Gastrointestinal: Abdomen soft, not distended.  Musculoskeletal: Normal ROM of extremities.  Skin: No obvious rashes or open sores.  Neurologic: Alert and oriented, CN 2-12 grossly intact.  Psychiatric: Answers questions appropriately with normal affect.  Hematologic/Lymphatic/Immunologic: No obvious bruises or sites of spontaneous bleeding.  Genitourinary: No CVA tenderness, bladder not palpable.   Soft right sided lewy collection; findings consistent with spermatocele vs hydrocele.  Right hemiscrotum moderately tender to palpation.   Patient reporting increased sensitivity of the area.   Left hemiscrotum side was normal and non-tender.       LABS & IMAGING:  Lab Results   Component Value Date    WBC 5.1 12/18/2023    HGB 16.0 12/18/2023    HCT 46.4 12/18/2023     12/18/2023    CHOL 225 (H) 12/18/2023    TRIG 135 12/18/2023    HDL 63.0 12/18/2023    ALT 18 12/18/2023    AST 23 12/18/2023     12/18/2023    K 4.3 12/18/2023     12/18/2023    CREATININE 1.30 12/18/2023    BUN 22 12/18/2023    CO2 25 12/18/2023    TSH 2.46 12/18/2023    PSA 0.7 03/07/2019    HGBA1C 5.4 12/18/2023     Scribed for Dr. George James by Richard Scanlon.  I, Dr. George James have personally reviewed and agreed with the information entered by the Virtual Scribe. 08/28/24.

## 2024-08-29 ENCOUNTER — HOSPITAL ENCOUNTER (OUTPATIENT)
Dept: RADIOLOGY | Facility: HOSPITAL | Age: 64
Discharge: HOME | End: 2024-08-29
Payer: COMMERCIAL

## 2024-08-29 DIAGNOSIS — N43.0 ENCYSTED HYDROCELE: ICD-10-CM

## 2024-08-29 DIAGNOSIS — N50.819 PAIN IN TESTICLE, UNSPECIFIED LATERALITY: ICD-10-CM

## 2024-08-29 DIAGNOSIS — N50.89 SCROTUM SWELLING: ICD-10-CM

## 2024-08-29 PROCEDURE — 93975 VASCULAR STUDY: CPT

## 2024-09-09 ENCOUNTER — APPOINTMENT (OUTPATIENT)
Dept: UROLOGY | Facility: CLINIC | Age: 64
End: 2024-09-09
Payer: COMMERCIAL

## 2024-09-09 VITALS — TEMPERATURE: 97.4 F

## 2024-09-09 DIAGNOSIS — N43.40 SPERMATOCELE: Primary | ICD-10-CM

## 2024-09-09 PROCEDURE — 99214 OFFICE O/P EST MOD 30 MIN: CPT | Performed by: STUDENT IN AN ORGANIZED HEALTH CARE EDUCATION/TRAINING PROGRAM

## 2024-09-09 ASSESSMENT — PAIN SCALES - GENERAL: PAINLEVEL: 3

## 2024-09-09 NOTE — PROGRESS NOTES
Scribed for Dr. George James by Richard Scanlon. I, Dr. George James have personally reviewed and agreed with the information entered by the Virtual Scribe. 09/09/24.    ASSESSMENT:  Problem List Items Addressed This Visit       Spermatocele - Primary       Epididymal cysts  Spermatoceles  Hydrocele, right  BPH with weak stream    PLAN:  #Spermatocele, right  Reviewed scrotal US results.   Reassured of benign findings.   No concerning findings on exam.   Suspect his mild R hemiscrotal swelling is 2/2 his hydrocele.  Albeit, c/o persistent R scrotal discomfort.     Discussed non-surgical vs surgical options including spermatocelectomy.  Risks, benefits, and complications discussed.   Post-op course and expectations reviewed.   Opts to discuss further with is Wife;  He will call to discuss OR date and planning.   Potential OR date(s); Nov 26th, Dec 6th, Jan 3rd.     #Epididymal cysts  Epididymal cysts are considered benign;  Provided reassurance;   Advised to call if he appreciates acute nodularities or changes.     #PSA screening  Last PSA ~ 0.90 (Dec 2023)  Repeat ordered, not yet performed.   FH of prostate cancer (Uncle)  Advised to continue annual PSA screening.     All questions were answered to the patient’s satisfaction.  Patient agrees with the plan and wishes to proceed.  Continue follow-up for ongoing care of his chronic medical conditions.       History of Present Illness (HPI):  Chidi presents for a follow up visit.  The patient’s EMR has been reviewed.  Lives in [place], [place].   Has a 2nd home in [place].   Occupation: [employer].  Children: 2x children (boys).    Hx of epididymal cysts, and R hydrocele.   Initially referred by his PCP for abnormal scrotal US.    TODAY: (09/09/24)  Presents for follow up and ultrasound results.   Reports he has been doing well overall.   Still with persistent scrotal discomfort.   Albeit, no acute or worsening complaints.   No recent infections, gross  hematuria, fevers or chills.     Scrotal ultrasound (08/29/24) personally reviewed.   Multiple nonspecific simple benign-appearing bilateral testicular cysts.  Large complex cystic structure/collection;  Located adjacent to the right testicle.   This may represent a large epididymal cyst/spermatocele.  Per my review, findings appear consistent with spermatocele.       TO REVIEW: Last visit (08/28/24)  Presents c/o persistent R scrotal discomfort.   Described as mild, but waxes and wanes in severity.   At times, has disrupted his ability to sleep.   Improves with taking Aleve PRN.   Worse with activity or exercise.   Denies any recent infections, gross hematuria, fevers, or chills.   IO urinalysis negative for blood or infection.     TO REVIEW: Initial visit (02/21/24)  In November 2023, he began to notice a scrotal mass.   Primarily located on the R side (testes).   Has not significant changed since then.   Denies any pain/discomfort or inguinal swelling.  Proceeded to follow up with his PCP in Dec 2023.   Since undergone a scrotal US and was referred to me.   Denies any prior  surgeries.   Scrotal ultrasound results reviewed with patient.      From a urinary standpoint.   Reports mild straining, and weak stream.   However, not very bothersome.   Nocturia x1. IPSS -12.      Scrotal US (12/26/23) personally reviewed.  Bilateral epididymal cysts/spermatoceles;  measure up to 6 mm on the left, some of which are complex.   Large complex right-sided hydrocele.  Cystic dilatation of the rete testes.     PSH: multiple joint and spine surgeries.  FH: Prostate cancer (Uncle)  SH: non-smoker.      No past medical history on file.  Past Surgical History:   Procedure Laterality Date    CT HEAD ANGIO W AND WO IV CONTRAST  01/18/2017    CT HEAD ANGIO W AND WO IV CONTRAST [facility]    KNEE ARTHROSCOPY W/ DEBRIDEMENT Left 1983    Knee Arthroscopy (Therapeutic), ACL present, NOT repaired    LUMBAR DISC SURGERY  2005     L4-5 discectomy (in )    MR NECK ANGIO WO IV CONTRAST  2016    MR NECK ANGIO WO IV CONTRAST [facility]    SHOULDER Right     AC dislocation with repair    TOTAL HIP ARTHROPLASTY Right 2019    Dr. Huddleston     Family History   Problem Relation Name Age of Onset    Arthritis Mother Shaniqua Fowler     Hypertension Mother Shaniqua Fowler     Vision loss Mother Shaniqua Fowler     Hearing loss Father Gaurang Fowler     Hernia Father Gaurang Fowler     Stroke Father Gaurang Fowler 65        First at 65 and second at 65 and  2022    Hypertension Sister Melinda     Hyperlipidemia Sister Melinda     Multiple sclerosis Sister Ayaan     Arthritis Brother Gabo Fowler         SALVADOR b/l    Cancer Mother's Brother Vamshi Lockhart         Brain    Prostate cancer Father's Brother Ubaldo     Heart disease Maternal Grandmother Priscila Fowler          with heart attack    Pneumonia Maternal Grandfather           with illness    Dementia Paternal Grandmother           at 88     Social History     Tobacco Use   Smoking Status Never   Smokeless Tobacco Never     Current Outpatient Medications   Medication Sig Dispense Refill    amoxicillin (Amoxil) 500 mg capsule TAKE 4 CAPS 1 HOUR BEFORE APPOINTMENT AND TAKE 2 CAPS 6 HOURS AFTER APPOINTMENT      melatonin 5 mg tablet,chewable Chew. Take 2 tablets by mouth at bedtime      protein supplement (PROTEIN ORAL) Take by mouth. Shake or bars daily as needed      psyllium seed (PSYLLIUM ORAL) Take 2 teaspoon in 8 ounces daily       No current facility-administered medications for this visit.     No Known Allergies  Past medical, surgical, family and social history in the chart was reviewed and is accurate including any additions to what is in this HPI.    REVIEW OF SYSTEMS (ROS):   Constitutional: denies any unintentional weight loss or change in strength.  Integumentary: denies any rashes or pruritus.  Eyes: denies any double vision or eye  pain.  Ear/Nose/Mouth/Throat: denies any nosebleeds or gum bleeds.  Cardiovascular: denies any chest pain or syncope.  Respiratory: denies hemoptysis.  Gastrointestinal: denies nausea or vomiting.  Musculoskeletal: denies muscle cramping or weakness.  Neurologic: denies convulsions or seizures.  Hematologic/Lymphatic: denies bleeding tendencies.  Endocrine: denies heat/cold intolerance.  All other systems have been reviewed and are negative unless otherwise noted in the HPI.     OBJECTIVE:  Visit Vitals  Temp 36.3 °C (97.4 °F)       PHYSICAL EXAM:  Constitutional: No obvious distress.  Eyes: Non-injected conjunctiva, sclera clear, EOMI.  Ears/Nose/Mouth/Throat: No obvious drainage per ears or nose.  Cardiovascular: Extremities are warm and well perfused. No edema, cyanosis or pallor.  Respiratory: No audible wheezing/stridor; respirations do not appear labored.  Gastrointestinal: Abdomen soft, not distended.  Musculoskeletal: Normal ROM of extremities.  Skin: No obvious rashes or open sores.  Neurologic: Alert and oriented, CN 2-12 grossly intact.  Psychiatric: Answers questions appropriately with normal affect.  Hematologic/Lymphatic/Immunologic: No obvious bruises or sites of spontaneous bleeding.  Genitourinary: No CVA tenderness, bladder not palpable.   Soft right sided lewy collection; findings consistent with spermatocele vs hydrocele.  Right hemiscrotum moderately tender to palpation.   Patient reporting increased sensitivity of the area.   Left hemiscrotum side was normal and non-tender.       LABS & IMAGING:  Lab Results   Component Value Date    WBC 5.1 12/18/2023    HGB 16.0 12/18/2023    HCT 46.4 12/18/2023     12/18/2023    CHOL 225 (H) 12/18/2023    TRIG 135 12/18/2023    HDL 63.0 12/18/2023    ALT 18 12/18/2023    AST 23 12/18/2023     12/18/2023    K 4.3 12/18/2023     12/18/2023    CREATININE 1.30 12/18/2023    BUN 22 12/18/2023    CO2 25 12/18/2023    TSH 2.46 12/18/2023    PSA  0.7 03/07/2019    HGBA1C 5.4 12/18/2023     Scribed for Dr. George James by Richard Scanlon.  I, Dr. George James have personally reviewed and agreed with the information entered by the Virtual Scribe. 09/09/24.

## 2024-09-11 PROBLEM — N43.40 SPERMATOCELE: Status: ACTIVE | Noted: 2024-09-11

## 2024-10-07 ENCOUNTER — APPOINTMENT (OUTPATIENT)
Dept: UROLOGY | Facility: CLINIC | Age: 64
End: 2024-10-07
Payer: COMMERCIAL

## 2024-12-16 ENCOUNTER — LAB (OUTPATIENT)
Dept: LAB | Facility: LAB | Age: 64
End: 2024-12-16
Payer: COMMERCIAL

## 2024-12-16 DIAGNOSIS — Z00.00 WELLNESS EXAMINATION: ICD-10-CM

## 2024-12-16 LAB
ALBUMIN SERPL BCP-MCNC: 4.3 G/DL (ref 3.4–5)
ALP SERPL-CCNC: 58 U/L (ref 33–136)
ALT SERPL W P-5'-P-CCNC: 20 U/L (ref 10–52)
ANION GAP SERPL CALCULATED.3IONS-SCNC: 10 MMOL/L (ref 10–20)
APPEARANCE UR: CLEAR
AST SERPL W P-5'-P-CCNC: 24 U/L (ref 9–39)
BASOPHILS # BLD AUTO: 0.04 X10*3/UL (ref 0–0.1)
BASOPHILS NFR BLD AUTO: 0.8 %
BILIRUB SERPL-MCNC: 0.8 MG/DL (ref 0–1.2)
BILIRUB UR STRIP.AUTO-MCNC: NEGATIVE MG/DL
BUN SERPL-MCNC: 19 MG/DL (ref 6–23)
CALCIUM SERPL-MCNC: 9.3 MG/DL (ref 8.6–10.3)
CHLORIDE SERPL-SCNC: 103 MMOL/L (ref 98–107)
CHOLEST SERPL-MCNC: 208 MG/DL (ref 0–199)
CHOLEST/HDLC SERPL: 3.7 {RATIO}
CO2 SERPL-SCNC: 32 MMOL/L (ref 21–32)
COLOR UR: COLORLESS
CREAT SERPL-MCNC: 1.18 MG/DL (ref 0.5–1.3)
EGFRCR SERPLBLD CKD-EPI 2021: 69 ML/MIN/1.73M*2
EOSINOPHIL # BLD AUTO: 0.06 X10*3/UL (ref 0–0.7)
EOSINOPHIL NFR BLD AUTO: 1.2 %
ERYTHROCYTE [DISTWIDTH] IN BLOOD BY AUTOMATED COUNT: 11.7 % (ref 11.5–14.5)
GLUCOSE SERPL-MCNC: 89 MG/DL (ref 74–99)
GLUCOSE UR STRIP.AUTO-MCNC: NORMAL MG/DL
HCT VFR BLD AUTO: 48.9 % (ref 41–52)
HDLC SERPL-MCNC: 55.8 MG/DL
HGB BLD-MCNC: 16.2 G/DL (ref 13.5–17.5)
IMM GRANULOCYTES # BLD AUTO: 0 X10*3/UL (ref 0–0.7)
IMM GRANULOCYTES NFR BLD AUTO: 0 % (ref 0–0.9)
KETONES UR STRIP.AUTO-MCNC: NEGATIVE MG/DL
LDLC SERPL CALC-MCNC: 121 MG/DL
LEUKOCYTE ESTERASE UR QL STRIP.AUTO: NEGATIVE
LYMPHOCYTES # BLD AUTO: 2.05 X10*3/UL (ref 1.2–4.8)
LYMPHOCYTES NFR BLD AUTO: 40 %
MCH RBC QN AUTO: 31.4 PG (ref 26–34)
MCHC RBC AUTO-ENTMCNC: 33.1 G/DL (ref 32–36)
MCV RBC AUTO: 95 FL (ref 80–100)
MONOCYTES # BLD AUTO: 0.42 X10*3/UL (ref 0.1–1)
MONOCYTES NFR BLD AUTO: 8.2 %
NEUTROPHILS # BLD AUTO: 2.56 X10*3/UL (ref 1.2–7.7)
NEUTROPHILS NFR BLD AUTO: 49.8 %
NITRITE UR QL STRIP.AUTO: NEGATIVE
NON HDL CHOLESTEROL: 152 MG/DL (ref 0–149)
NRBC BLD-RTO: 0 /100 WBCS (ref 0–0)
PH UR STRIP.AUTO: 6.5 [PH]
PLATELET # BLD AUTO: 264 X10*3/UL (ref 150–450)
POTASSIUM SERPL-SCNC: 4.5 MMOL/L (ref 3.5–5.3)
PROT SERPL-MCNC: 6.5 G/DL (ref 6.4–8.2)
PROT UR STRIP.AUTO-MCNC: NEGATIVE MG/DL
RBC # BLD AUTO: 5.16 X10*6/UL (ref 4.5–5.9)
RBC # UR STRIP.AUTO: NEGATIVE /UL
SODIUM SERPL-SCNC: 140 MMOL/L (ref 136–145)
SP GR UR STRIP.AUTO: 1.01
TRIGL SERPL-MCNC: 154 MG/DL (ref 0–149)
TSH SERPL-ACNC: 1.9 MIU/L (ref 0.44–3.98)
UROBILINOGEN UR STRIP.AUTO-MCNC: NORMAL MG/DL
VLDL: 31 MG/DL (ref 0–40)
WBC # BLD AUTO: 5.1 X10*3/UL (ref 4.4–11.3)

## 2024-12-16 PROCEDURE — 86141 C-REACTIVE PROTEIN HS: CPT

## 2024-12-16 PROCEDURE — 81003 URINALYSIS AUTO W/O SCOPE: CPT

## 2024-12-16 PROCEDURE — 84443 ASSAY THYROID STIM HORMONE: CPT

## 2024-12-16 PROCEDURE — 80053 COMPREHEN METABOLIC PANEL: CPT

## 2024-12-16 PROCEDURE — 36415 COLL VENOUS BLD VENIPUNCTURE: CPT

## 2024-12-16 PROCEDURE — 85025 COMPLETE CBC W/AUTO DIFF WBC: CPT

## 2024-12-16 PROCEDURE — 83036 HEMOGLOBIN GLYCOSYLATED A1C: CPT

## 2024-12-16 PROCEDURE — 84153 ASSAY OF PSA TOTAL: CPT

## 2024-12-16 PROCEDURE — 80061 LIPID PANEL: CPT

## 2024-12-16 PROCEDURE — 82306 VITAMIN D 25 HYDROXY: CPT

## 2024-12-17 LAB
25(OH)D3 SERPL-MCNC: 52 NG/ML (ref 30–100)
CRP SERPL HS-MCNC: 0.3 MG/L
EST. AVERAGE GLUCOSE BLD GHB EST-MCNC: 108 MG/DL
HBA1C MFR BLD: 5.4 %
PSA SERPL-MCNC: 0.87 NG/ML

## 2024-12-19 ENCOUNTER — OFFICE VISIT (OUTPATIENT)
Dept: PRIMARY CARE | Facility: CLINIC | Age: 64
End: 2024-12-19
Payer: COMMERCIAL

## 2024-12-19 VITALS
OXYGEN SATURATION: 99 % | DIASTOLIC BLOOD PRESSURE: 78 MMHG | HEART RATE: 69 BPM | SYSTOLIC BLOOD PRESSURE: 118 MMHG | HEIGHT: 69 IN | WEIGHT: 177 LBS | BODY MASS INDEX: 26.22 KG/M2

## 2024-12-19 DIAGNOSIS — Z00.00 WELLNESS EXAMINATION: Primary | ICD-10-CM

## 2024-12-19 DIAGNOSIS — D12.6 SESSILE SERRATED POLYP OF COLON: ICD-10-CM

## 2024-12-19 DIAGNOSIS — Z23 NEED FOR VACCINATION: ICD-10-CM

## 2024-12-19 DIAGNOSIS — L57.0 ACTINIC KERATOSES: ICD-10-CM

## 2024-12-19 DIAGNOSIS — E78.2 MIXED HYPERLIPIDEMIA: ICD-10-CM

## 2024-12-19 DIAGNOSIS — Z11.59 NEED FOR HEPATITIS C SCREENING TEST: ICD-10-CM

## 2024-12-19 DIAGNOSIS — N43.40 SPERMATOCELE: ICD-10-CM

## 2024-12-19 DIAGNOSIS — Z11.4 ENCOUNTER FOR SCREENING FOR HIV: ICD-10-CM

## 2024-12-19 RX ORDER — BISMUTH SUBSALICYLATE 262 MG
1 TABLET,CHEWABLE ORAL DAILY
Start: 2024-12-19 | End: 2025-12-19

## 2024-12-19 NOTE — Clinical Note
Please schedule 1.  Physical in NOVEMBER 2025 (before Thanksgiving) 2. Referral to Nutrition/[facility]

## 2024-12-19 NOTE — PROGRESS NOTES
"Subjective   Patient ID: Chidi Fowler is a 64 y.o. male who presents for Annual Exam.    Wellness exam/physical    Overall, patient has been feeling well.    History of colon polyps  Next screening colonoscopy due in 12/2027    Hyperlipidemia  Previous 0 calcium score (2019)  Active lifestyle.  No chest pain or shortness of breath.  Lipid panel reviewed and values are improved compared to last year.    Spermatocele/epididymal cyst  Intermittent testicular pain for which patient has seen Dr. James in urology.  Option of surgical resection discussed, on hold.  Currently, patient doing well without symptoms.    Posterior vitreous detachment  Patient has had 2 episodes this past year, currently followed by Dr. Horne.  No need for surgical intervention.    Health maintenance  Exercise: Weightlifting and high intensity training 3 days a week.  Walking 3 days a week up to 3 miles  Colon cancer screening: See above  Ophthalmology: Dr. Macedo and Dr. Horne  Dermatology: Dr. Aguirre, full-body skin exam on 12/16, actinic keratoses removed    Social  Family doing well.  Self-employed, work going well.         Review of Systems   Constitutional:  Negative for fatigue and unexpected weight change.   HENT:  Negative for postnasal drip, rhinorrhea and sore throat.    Eyes:  Negative for visual disturbance.   Respiratory:  Negative for cough and shortness of breath.    Cardiovascular:  Negative for chest pain, palpitations and leg swelling.   Gastrointestinal:  Negative for abdominal pain, constipation and diarrhea.   Genitourinary:  Negative for dysuria, frequency and urgency.   Musculoskeletal:  Negative for arthralgias and back pain.   Neurological:  Negative for dizziness and headaches.   Psychiatric/Behavioral:  Negative for dysphoric mood. The patient is not nervous/anxious.        Objective   /78 (BP Location: Left arm, Patient Position: Sitting, BP Cuff Size: Adult)   Pulse 69   Ht 1.753 m (5' 9\")   Wt 80.3 kg " (177 lb)   SpO2 99%   BMI 26.14 kg/m²     Physical Exam  Vitals reviewed.   Constitutional:       Appearance: Normal appearance.   HENT:      Head: Normocephalic.      Right Ear: Tympanic membrane normal.      Left Ear: Tympanic membrane normal.      Mouth/Throat:      Mouth: Mucous membranes are moist.      Pharynx: No oropharyngeal exudate or posterior oropharyngeal erythema.   Eyes:      Conjunctiva/sclera: Conjunctivae normal.   Cardiovascular:      Rate and Rhythm: Normal rate and regular rhythm.      Heart sounds: No murmur heard.  Pulmonary:      Effort: Pulmonary effort is normal.      Breath sounds: Normal breath sounds.   Abdominal:      General: Bowel sounds are normal.      Palpations: Abdomen is soft.      Tenderness: There is no abdominal tenderness.   Genitourinary:     Comments: Patient defers KEM okay  Musculoskeletal:         General: Normal range of motion.      Right lower leg: No edema.      Left lower leg: No edema.   Lymphadenopathy:      Cervical: No cervical adenopathy.   Neurological:      General: No focal deficit present.      Mental Status: He is alert and oriented to person, place, and time.   Psychiatric:         Mood and Affect: Mood normal.         Assessment/Plan     Wellness exam/physical  Continued regular exercise recommended with goal of 120-150 minutes/week  Continued well-balanced diet rich in fruits, vegetables, fiber, lean protein recommended  Influenza vaccine given today  Shingrix vaccine recommended  Continued routine follow-up with ophthalmology, Dr. Macedo, recommended  Continue routine follow-up with dentistry recommended  Referral for nutrition consult    History of colon polyps  Next screening colonoscopy due in 12/2027    Hyperlipidemia (improved)  Previous 0 calcium score (2019)  Maintain healthy lifestyle with low-fat/cholesterol, high-fiber diet and regular aerobic exercise    Spermatocele/epididymal cyst  Monitor expectantly  Follow-up with urology,   Jacob, as needed    Posterior vitreous detachment  Continue routine follow-up with Dr. Horne    Follow-up  Wellness exam/physical in 11/2025  (Fasting lab work be ordered complete 3 to 7 days prior)    Chidi Dupree MD

## 2024-12-19 NOTE — PATIENT INSTRUCTIONS
Wellness exam/physical  Continued regular exercise recommended with goal of 120-150 minutes/week  Continued well-balanced diet rich in fruits, vegetables, fiber, lean protein recommended  Influenza vaccine given today  Shingrix vaccine recommended  Continued routine follow-up with ophthalmology, Dr. Macedo, recommended  Continued routine follow-up with dentistry recommended  Referral for nutrition consult    History of colon polyps  Next screening colonoscopy due in 12/2027    Hyperlipidemia (improved)  Previous 0 calcium score (2019)  Maintain healthy lifestyle with low-fat/cholesterol, high-fiber diet and regular aerobic exercise    Spermatocele/epididymal cyst  Monitor expectantly  Follow-up with urology, Dr. James, as needed    Posterior vitreous detachment  Continue routine follow-up with Dr. Horne    Follow-up  Wellness exam/physical in 11/2025  (Fasting lab work be ordered complete 3 to 7 days prior)

## 2024-12-21 DIAGNOSIS — Z00.00 WELLNESS EXAMINATION: Primary | ICD-10-CM

## 2024-12-21 ASSESSMENT — ENCOUNTER SYMPTOMS
DYSPHORIC MOOD: 0
FATIGUE: 0
SHORTNESS OF BREATH: 0
ARTHRALGIAS: 0
HEADACHES: 0
BACK PAIN: 0
DIARRHEA: 0
FREQUENCY: 0
CONSTIPATION: 0
DYSURIA: 0
COUGH: 0
NERVOUS/ANXIOUS: 0
RHINORRHEA: 0
UNEXPECTED WEIGHT CHANGE: 0
SORE THROAT: 0
PALPITATIONS: 0
DIZZINESS: 0
ABDOMINAL PAIN: 0

## 2024-12-27 ENCOUNTER — APPOINTMENT (OUTPATIENT)
Dept: PRIMARY CARE | Facility: CLINIC | Age: 64
End: 2024-12-27
Payer: COMMERCIAL

## 2025-01-03 ENCOUNTER — APPOINTMENT (OUTPATIENT)
Dept: NUTRITION | Facility: HOSPITAL | Age: 65
End: 2025-01-03
Payer: COMMERCIAL

## 2025-01-21 ENCOUNTER — APPOINTMENT (OUTPATIENT)
Dept: PRIMARY CARE | Facility: CLINIC | Age: 65
End: 2025-01-21
Payer: COMMERCIAL

## 2025-01-21 DIAGNOSIS — Z00.00 WELLNESS EXAMINATION: ICD-10-CM

## 2025-01-21 PROCEDURE — NUTCO NUTRITION CONSULTATION: Performed by: DIETITIAN, REGISTERED

## 2025-01-21 NOTE — PROGRESS NOTES
"Reason for Nutrition Visit:  It was a pleasure speaking with Mr. Fowler today to discuss diet and nutrition as part of the  [facility] program.    Medication Documentation Review Audit       Reviewed by Sabine Agosto CMA (Medical Assistant) on 12/19/24 at 1649      Medication Order Taking? Sig Documenting Provider Last Dose Status   amoxicillin (Amoxil) 500 mg capsule [id number] Yes TAKE 4 CAPS 1 HOUR BEFORE APPOINTMENT AND TAKE 2 CAPS 6 HOURS AFTER APPOINTMENT Historical Provider, MD Not Taking Active   melatonin 5 mg tablet,chewable [id number] Yes Chew. Take 2 tablets by mouth at bedtime Historical Provider, MD Taking Active   protein supplement (PROTEIN ORAL) [id number] Yes Take by mouth. Shake or bars daily as needed Historical Provider, MD Taking Active   psyllium seed (PSYLLIUM ORAL) [id number] Yes Take 2 teaspoon in 8 ounces daily Historical Provider, MD Taking Active                     Past Medical Hx:  Patient Active Problem List   Diagnosis    Agatston coronary artery calcium score less than 100    Mixed hyperlipidemia    Degeneration of lumbar or lumbosacral intervertebral disc    Elevated testosterone level    History of total hip replacement, right    Hx of lumbosacral spine surgery    Primary osteoarthritis of left hip    Sessile serrated polyp of colon    Palpitations    Actinic keratoses    Scrotum swelling    Hydrocele, right    Cyst of testis    VT (ventricular tachycardia) (Multi)    Spermatocele        Weight change:  His current weight is 177lbs.  He would like to change body composition and lose some fat.  Significant Weight Change: No    Lab Results   Component Value Date    HGBA1C 5.4 12/16/2024    CHOL 208 (H) 12/16/2024    LDLCALC 121 (H) 12/16/2024    TRIG 154 (H) 12/16/2024    HDL 55.8 12/16/2024        Food and Nutrition Hx:  He reports eating 2 \"real meals\"-a late breakfast (10am) and a late dinner (as late as 9pm).  In the late afternoon/early evening he will have a protein shake (Muscle " Milk), or a protein bar such as Pure Protein.     He tracks his food intake on an excel spreadsheet.  A typical day is about 9870-8693 calories.  He is getting ~170g protein per day.  He rotates between chicken, sirloin, tuna, salmon, grouper, snapper for protein.  He is eating fish about 3 times a week.  He eats more now that he is down in [place].     His metamucil is providing 6g fiber per day.    Food Recall:  10:00am: 3 egg whites, 1 whole egg (scrambled); 40g oatmeal, 30g dried cranberries, walnuts, blueberries, 2 strips of turkey campos, 3 cups of coffee with half and half  4:00pm: protein shake or protein bar  6:30pm: grilled chicken, papo rice, green beans or spinach    Beverages: water-5 16oz bottles a day; coffee-3 cups (10oz cups) a day with half and half (2.5 Tbsp in each cup); alcohol-1-2 nights a week, 2 glasses of red wine a week, occasional Fisher; Muscle Milk-2-3 days a week    Allergies: None  Intolerance: None    GI Symptoms : None He has a bowel movement everyday.    Exercise: He goes to the gym 3 days a week, his workouts take 2.5 hours to complete.  He starts with stretching and core work.  He does 1 hour and 45 minutes of lifting/strength training.  He finishes with 15 minutes of HIIT cardio (bike or stair climber). He plans to resume walking with his wife now that they are back in [place].  He is going to aim for 3 days a week, 3-3.5 miles.      Sleep duration/quality : 7 hours a night.  He states it is not always good quality.  He tends to be up late at night.  He gets done working 4-6pm.  He goes to the gym right after work and this leads to eating dinner around 9pm and then stays up until midnight.  He goes to bed around midnight every night.  He gets up around 7am.  He tosses and turns during the night.    Supplements: Multivitamin, Melatonin, and Metamucil (daily at night), Creatine (daily).  He also uses Muscle Milk and protein bars such as Pure Protein.      Cravings:  Salty-pretzels  Energy Levels: Fluctuates-his energy is good when he is moving, if he slows down his energy slumps      Estimated Nutrient Needs:    Calories for Weight Maintenance: 2385 ([place] [address] Jeor x 1.5 activity factor)  Protein Needs: 170g/day (1g/lb)    Nutrition Diagnosis:    Diagnosis Statement 1:  Diagnosis Status: New  Diagnosis : Inadequate fiber intake  related to food and nutrition related knowledge deficit concerning desirable quantities of fiber as evidenced by  only eating 2 meals a day, food recall showing he is not meeting the recommended 35-40g fiber per day    Diagnosis Statement 2:  Diagnosis Status: New  Diagnosis : Altered nutrition related lab values  related to  dietary and physiological factors  as evidenced by  elevated cholesterol (208mg/dl), elevated LDL cholesterol (121mg/dl), elevated triglycerides (154mg/dl)      Nutrition Interventions:  Increased Fiber Diet      Nutrition Goals:  Nutrition Goals : Consume prescribed supplements  Decrease total cholesterol  Reduce LDL level  Reduce Triglyceride level  Adequate fiber intake    Nutrition Recommendations:    1) I would recommend tracking your food intake using an giuseppe such as My Fitness Pal or Cronometer.  This will be a way to monitor your fiber intake.     2) You can also use food tracking to modify your macronutrient breakdown to work towards a fat loss phase.  This breakdown would look like 50% of calories coming from fat, 30% of calories coming from protein and 20% of calories from carbohydrates.  I would do this for 6 weeks and then go back to your usual macros.    3) Aim for 30-35g fiber daily.  One way to help you reach this goal is to include non-starchy vegetables with two meals a day (at least 1-2 cups).  Be sure to include a wide variety of colorful vegetables throughout the week.      4) Choose 3 out of 5 of the following daily to help you reach your daily fiber goals:  -1 cup berries (4-5g fiber)  -1/2 cup beans (7g  fiber)  -1/4 cup nuts (5g fiber)  -1/3 avocado (4g fiber)  -3 cups dark leafy greens (5g fiber)    5) Try to add more variety to your food choices.  For example, rotate different nuts to use at breakfast as well as different vegetables to use.  I would encourage incorporating more cruciferous vegetables-broccoli, cauliflower, cabbage, brussel sprouts, asparagus, kale.    6) I would recommend switching to higher quality protein supplements (bars drinks).  Recommended brands for pre-made protein drinks: OWYN Pro Elite pre-made protein drink or Iconic grass fed whey protein drink.  Recommended bars: Quest, Aloha, No Cow (these have BOTH protein and fiber).  Recommended protein powder: Naked Whey.    7) Try to move some of your protein at dinner up to your breakfast.  Aim to start your day with at least 50g protein.  You can add 3oz of leftover protein from dinner to your eggs/egg whites.     8) Consider adding a greens powder to your supplement regimen, such as AG1 powder mixed in water daily.  This can take the place of your multivitamin.  This would have the additional benefits of phytonutrients and antioxidants.

## 2025-04-18 ENCOUNTER — PATIENT MESSAGE (OUTPATIENT)
Dept: PRIMARY CARE | Facility: CLINIC | Age: 65
End: 2025-04-18
Payer: COMMERCIAL

## 2025-04-18 DIAGNOSIS — M51.372 DEGENERATION OF INTERVERTEBRAL DISC OF LUMBOSACRAL REGION WITH DISCOGENIC BACK PAIN AND LOWER EXTREMITY PAIN: ICD-10-CM

## 2025-04-18 DIAGNOSIS — M54.16 LEFT LUMBAR RADICULOPATHY: ICD-10-CM

## 2025-04-18 DIAGNOSIS — M54.50 ACUTE LEFT-SIDED LOW BACK PAIN, UNSPECIFIED WHETHER SCIATICA PRESENT: Primary | ICD-10-CM

## 2025-04-18 RX ORDER — CYCLOBENZAPRINE HCL 10 MG
10 TABLET ORAL 3 TIMES DAILY PRN
Qty: 30 TABLET | Refills: 0 | Status: SHIPPED | OUTPATIENT
Start: 2025-04-18 | End: 2025-06-17

## 2025-04-18 RX ORDER — METHYLPREDNISOLONE 4 MG/1
TABLET ORAL
Qty: 21 TABLET | Refills: 0 | Status: SHIPPED | OUTPATIENT
Start: 2025-04-18 | End: 2025-04-24

## 2025-04-18 NOTE — PATIENT COMMUNICATION
Patient and I spoke.  He likely has lumbar strain and possible underlying lumbar degenerative arthritis based on messaging.  I have not seen the imaging results.  He has tried Advil 600 mg with limited benefit.    It is agreed we will try anti-inflammatory treatment with Medrol Dosepak and cyclobenzaprine as muscle relaxant.    Patient has been advised to update me next week with how he is doing.    Chidi Dupree MD

## 2025-04-29 PROBLEM — M54.16 LEFT LUMBAR RADICULOPATHY: Status: ACTIVE | Noted: 2025-04-29

## 2025-05-16 ENCOUNTER — APPOINTMENT (OUTPATIENT)
Dept: RADIOLOGY | Facility: CLINIC | Age: 65
End: 2025-05-16
Payer: COMMERCIAL

## 2025-05-19 ENCOUNTER — APPOINTMENT (OUTPATIENT)
Dept: RADIOLOGY | Facility: CLINIC | Age: 65
End: 2025-05-19
Payer: COMMERCIAL

## 2025-05-20 ENCOUNTER — APPOINTMENT (OUTPATIENT)
Dept: PRIMARY CARE | Facility: CLINIC | Age: 65
End: 2025-05-20
Payer: COMMERCIAL

## 2025-05-20 ENCOUNTER — TELEPHONE (OUTPATIENT)
Dept: PRIMARY CARE | Facility: CLINIC | Age: 65
End: 2025-05-20

## 2025-05-20 NOTE — TELEPHONE ENCOUNTER
Chidi SARMIENTO cancelled his appointment because he doesn't want an MRI.  His back pain is gone and he's back to normal.

## 2025-05-28 ENCOUNTER — APPOINTMENT (OUTPATIENT)
Dept: PAIN MEDICINE | Facility: CLINIC | Age: 65
End: 2025-05-28
Payer: COMMERCIAL

## 2025-11-18 ENCOUNTER — APPOINTMENT (OUTPATIENT)
Dept: PRIMARY CARE | Facility: CLINIC | Age: 65
End: 2025-11-18
Payer: COMMERCIAL